# Patient Record
Sex: FEMALE | Race: OTHER | NOT HISPANIC OR LATINO
[De-identification: names, ages, dates, MRNs, and addresses within clinical notes are randomized per-mention and may not be internally consistent; named-entity substitution may affect disease eponyms.]

---

## 2017-02-19 PROBLEM — Z00.00 ENCOUNTER FOR PREVENTIVE HEALTH EXAMINATION: Status: ACTIVE | Noted: 2017-02-19

## 2017-02-24 ENCOUNTER — RECORD ABSTRACTING (OUTPATIENT)
Age: 57
End: 2017-02-24

## 2017-02-24 DIAGNOSIS — M71.30 OTHER BURSAL CYST, UNSPECIFIED SITE: ICD-10-CM

## 2017-02-24 DIAGNOSIS — Z80.9 FAMILY HISTORY OF MALIGNANT NEOPLASM, UNSPECIFIED: ICD-10-CM

## 2017-02-24 DIAGNOSIS — Z78.9 OTHER SPECIFIED HEALTH STATUS: ICD-10-CM

## 2017-02-24 DIAGNOSIS — M17.12 UNILATERAL PRIMARY OSTEOARTHRITIS, LEFT KNEE: ICD-10-CM

## 2017-02-24 DIAGNOSIS — S83.222A PERIPHERAL TEAR OF MEDIAL MENISCUS, CURRENT INJURY, LEFT KNEE, INITIAL ENCOUNTER: ICD-10-CM

## 2017-02-24 DIAGNOSIS — Z82.61 FAMILY HISTORY OF ARTHRITIS: ICD-10-CM

## 2017-02-24 DIAGNOSIS — M65.9 SYNOVITIS AND TENOSYNOVITIS, UNSPECIFIED: ICD-10-CM

## 2017-02-24 DIAGNOSIS — M25.562 PAIN IN LEFT KNEE: ICD-10-CM

## 2017-02-24 RX ORDER — CELECOXIB 200 MG/1
200 CAPSULE ORAL
Refills: 0 | Status: ACTIVE | COMMUNITY

## 2017-02-24 RX ORDER — TRAMADOL HYDROCHLORIDE 50 MG/1
50 TABLET, COATED ORAL
Refills: 0 | Status: ACTIVE | COMMUNITY

## 2017-03-20 ENCOUNTER — APPOINTMENT (OUTPATIENT)
Dept: ORTHOPEDIC SURGERY | Facility: CLINIC | Age: 57
End: 2017-03-20

## 2017-03-20 DIAGNOSIS — S83.272A COMPLEX TEAR OF LATERAL MENISCUS, CURRENT INJURY, LEFT KNEE, INITIAL ENCOUNTER: ICD-10-CM

## 2017-03-20 DIAGNOSIS — M19.90 UNSPECIFIED OSTEOARTHRITIS, UNSPECIFIED SITE: ICD-10-CM

## 2017-03-20 DIAGNOSIS — S83.232A COMPLEX TEAR OF MEDIAL MENISCUS, CURRENT INJURY, LEFT KNEE, INITIAL ENCOUNTER: ICD-10-CM

## 2017-05-01 ENCOUNTER — APPOINTMENT (OUTPATIENT)
Dept: ORTHOPEDIC SURGERY | Facility: CLINIC | Age: 57
End: 2017-05-01

## 2018-07-26 PROBLEM — M17.12 PRIMARY OSTEOARTHRITIS OF ONE KNEE, LEFT: Status: ACTIVE | Noted: 2017-02-24

## 2023-03-17 ENCOUNTER — RESULT REVIEW (OUTPATIENT)
Age: 63
End: 2023-03-17

## 2023-03-17 DIAGNOSIS — M17.11 UNILATERAL PRIMARY OSTEOARTHRITIS, RIGHT KNEE: ICD-10-CM

## 2023-03-20 ENCOUNTER — APPOINTMENT (OUTPATIENT)
Dept: ORTHOPEDIC SURGERY | Facility: CLINIC | Age: 63
End: 2023-03-20
Payer: COMMERCIAL

## 2023-03-20 ENCOUNTER — OUTPATIENT (OUTPATIENT)
Dept: OUTPATIENT SERVICES | Facility: HOSPITAL | Age: 63
LOS: 1 days | End: 2023-03-20
Payer: COMMERCIAL

## 2023-03-20 ENCOUNTER — TRANSCRIPTION ENCOUNTER (OUTPATIENT)
Age: 63
End: 2023-03-20

## 2023-03-20 VITALS
RESPIRATION RATE: 14 BRPM | OXYGEN SATURATION: 91 % | DIASTOLIC BLOOD PRESSURE: 90 MMHG | HEART RATE: 83 BPM | SYSTOLIC BLOOD PRESSURE: 145 MMHG

## 2023-03-20 DIAGNOSIS — Z98.890 OTHER SPECIFIED POSTPROCEDURAL STATES: Chronic | ICD-10-CM

## 2023-03-20 DIAGNOSIS — Z87.39 PERSONAL HISTORY OF OTHER DISEASES OF THE MUSCULOSKELETAL SYSTEM AND CONNECTIVE TISSUE: ICD-10-CM

## 2023-03-20 DIAGNOSIS — G89.29 PAIN IN RIGHT KNEE: ICD-10-CM

## 2023-03-20 DIAGNOSIS — M17.31 UNILATERAL POST-TRAUMATIC OSTEOARTHRITIS, RIGHT KNEE: ICD-10-CM

## 2023-03-20 DIAGNOSIS — M17.32 UNILATERAL POST-TRAUMATIC OSTEOARTHRITIS, LEFT KNEE: ICD-10-CM

## 2023-03-20 DIAGNOSIS — Z82.49 FAMILY HISTORY OF ISCHEMIC HEART DISEASE AND OTHER DISEASES OF THE CIRCULATORY SYSTEM: ICD-10-CM

## 2023-03-20 DIAGNOSIS — Z98.51 TUBAL LIGATION STATUS: Chronic | ICD-10-CM

## 2023-03-20 DIAGNOSIS — E78.00 PURE HYPERCHOLESTEROLEMIA, UNSPECIFIED: ICD-10-CM

## 2023-03-20 DIAGNOSIS — M25.561 PAIN IN RIGHT KNEE: ICD-10-CM

## 2023-03-20 PROCEDURE — 77073 BONE LENGTH STUDIES: CPT

## 2023-03-20 PROCEDURE — 77073 BONE LENGTH STUDIES: CPT | Mod: 26

## 2023-03-20 PROCEDURE — 73564 X-RAY EXAM KNEE 4 OR MORE: CPT

## 2023-03-20 PROCEDURE — 99205 OFFICE O/P NEW HI 60 MIN: CPT

## 2023-03-20 PROCEDURE — 73564 X-RAY EXAM KNEE 4 OR MORE: CPT | Mod: 26,50,59

## 2023-03-20 NOTE — PHYSICAL EXAM
[de-identified] : Right Knee:\par Full extension but it hurts with full extension.\par Flexion was to 80 degrees in the beginning of the exam and then increased to 100 by the end of the exam.\par ACL and PCL stable.\par Lateral joint line tenderness.\par Posterior lateral tenderness.\par Mild medial tenderness only.\par Stable to varus and valgus stress testing.\par palpable pulses at the foot and ankle.\par Motor is 5/5 knee extension and flexion.\par Ankle DF and PF is 5/5\par Hip motion is not painful and full.\par Skin is healthy with old arthroscopic portal healed incisions.\par \par Left Knee is not point tender to palpation on the medial or lateral joint line. But it hurts with walking.\par ACL and PCL is stable.\par Stable to varus and valgus stress.\par Full extension with 115 degrees of flexion.\par good pulses.\par good sensation to light touch.\par No calf tenderness.\par Skin in great condition.\par \par \par \par  [de-identified] : Right Knee 2 degrees of varus\par Left Knee 4 degrees of varus\par \par Right Knee:\par Bone on bone lateral compartment on the flexion weight bearing view.\par Osteophytes medial and lateral on the tibia and femur.\par patellofemoral osteophytes superior on patella and anterior on femur and anterior tibial plateau.\par Mild posterior femoral osteophytes.\par The right sunrise view of the knee shows a large medial femoral osteophyte and a large patellar osteophyte large lateral femoral osteophytes and a elongated lateral facet of the patella.  The patella is centrally tracking with mild joint space narrowing.\par \par Left knee:\par The AP view shows moderate medial compartment joint space narrowing with medial tibial and femoral osteophytes and a flattened medial femoral condyle.  The lateral tibial plateau has osteophytes as well as lateral femoral condyle has osteophytes.\par The flexion weightbearing view on the left shows medial femoral and tibial osteophytes with mild to moderate medial compartment joint space narrowing as well as lateral osteophytes.\par The left lateral view shows some small posterior femoral osteophytes posterior tibial osteophytes and a superior patella osteophyte.\par The sunrise view of the left knee shows large medial patellar osteophyte and medial femoral osteophytes with a large lateral femoral osteophyte and lateral facet osteophyte with a centrally tracking patella and a preserved joint space.

## 2023-03-20 NOTE — DISCUSSION/SUMMARY
[de-identified] : Assessment:\par -Right greater than left knee osteoarthritis pain\par -Right knee seems to have a component of meniscal tearing that is causing locking intermittently in her right knee\par -Bilateral knee osteoarthritis\par -History of bilateral knee arthroscopies for meniscal tears in the past\par \par -Right knee severe bone-on-bone lateral compartment joint space loss\par -Right medial and lateral femoral and tibial osteophytes as well as patellofemoral osteophytes\par \par -Moderate left knee osteoarthritis with moderate medial compartment joint space narrowing\par -Large medial tibial and femoral osteophytes mild to moderate lateral tibial and femoral osteophytes\par -Mild to moderate patellofemoral osteophytes on the left as well\par \par Plan:\par -The patient is a good candidate for knee replacement surgery.\par -The right knee is the more symptomatic at this time and would be the patient's preference to have this surgery done first.\par -A right total knee replacement using the Len robotic system as an outpatient at the [facility] would be my recommendation.\par -This would follow with home physical therapy followed by outpatient physical therapy.\par -Once the patient has recovered from the right knee, we would then assess the status of her left knee to decide when we would proceed with fixing the left knee.\par -I would have the patient see her primary care physician for medical preoperative clearance.\par -She would then also require a [facility] protocol CT scan of the right knee for preoperative planning and sizing of her implants.\par -We will help make all arrangements necessary to make the process smooth and seamless for her.  \par \par \par Discussion Total Knee Replacement:\par \par I had a discussion with the patient regarding the findings of their history, exam and imaging. We discussed the option of Total Knee Replacement using the precision Len™ Robotic-Arm System. We discussed the indications, surgical process, my techniques of surgery, the probable post-operative course and instructions, and the risks and benefits of the procedure. The patient had their questions answered to their satisfaction.\par \par Although there are no guarantees to success, I feel that the surgery would be very beneficial and there is an excellent chance for a positive outcome.\par \par The risks of this procedure include but are not limited to the risks of anesthesia, heart attack, stroke, respiratory complications, wound healing problems, skin blisters delayed wound healing, infection, bleeding, nerve damage, vessel damage, skin sensory changes next to the incisions, loss of motion, scar tissue formation requiring further treatment, blood clots, heterotopic bone formation, implant wear, implant loosening after surgery, allergic responses to the implant materials, continued pain despite proper implant placement, soft tissue pain, possible recurrent knee swelling, and the possibility for further surgery in the future.\par \par I look forward to the opportunity to help this patient with their painful knee condition.\par

## 2023-03-20 NOTE — HISTORY OF PRESENT ILLNESS
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 Lou in 2016.\par She is here today complaining of pain in both knees.  \par \par The right knee she is been having increasing pain since last Thursday where she has been unable to bend as much as she usually does.  She says when she gets up from a chair after sitting for a long period of time she has pain in the back outside of the knee and when she tries to keep the knee to straighten it hurts as well she has pain when walking and she is unable to climb stairs without any pain.  Her pain level is a 6 out of 10 on the right she feels that she has a decreased walk distance tolerance.  The knee feels weaker.  She notices a grinding and crunching sensation and is getting locked in a position at times now since Thursday.  The pain is mostly on the outer side of the right knee and in the back of the knee.  She has trouble rising from a seat getting on and off toilets going up and down stairs and walking.  She has tried rest ice anti-inflammatories physical therapy and she has had a knee arthroscopy in the year 2000 on the right side.\par \par The left knee although she has some issues that it is not like the right knee that is causing her significant pain now.

## 2023-03-29 ENCOUNTER — APPOINTMENT (OUTPATIENT)
Dept: ORTHOPEDIC SURGERY | Facility: CLINIC | Age: 63
End: 2023-03-29

## 2023-03-29 ENCOUNTER — APPOINTMENT (OUTPATIENT)
Dept: CT IMAGING | Facility: CLINIC | Age: 63
End: 2023-03-29

## 2023-04-04 ENCOUNTER — APPOINTMENT (OUTPATIENT)
Age: 63
End: 2023-04-04

## 2023-04-05 ENCOUNTER — APPOINTMENT (OUTPATIENT)
Dept: ORTHOPEDIC SURGERY | Facility: CLINIC | Age: 63
End: 2023-04-05

## 2023-04-26 ENCOUNTER — APPOINTMENT (OUTPATIENT)
Dept: ORTHOPEDIC SURGERY | Facility: CLINIC | Age: 63
End: 2023-04-26